# Patient Record
Sex: FEMALE | Race: WHITE | Employment: FULL TIME | URBAN - METROPOLITAN AREA
[De-identification: names, ages, dates, MRNs, and addresses within clinical notes are randomized per-mention and may not be internally consistent; named-entity substitution may affect disease eponyms.]

---

## 2024-04-08 ENCOUNTER — HOSPITAL ENCOUNTER (OUTPATIENT)
Facility: HOSPITAL | Age: 64
Discharge: HOME | End: 2024-04-11
Payer: COMMERCIAL

## 2024-04-08 ENCOUNTER — HOSPITAL ENCOUNTER (OUTPATIENT)
Facility: HOSPITAL | Age: 64
Discharge: HOME | End: 2024-04-11

## 2024-04-08 DIAGNOSIS — Z01.818 PRE-OP TESTING: Primary | ICD-10-CM

## 2024-04-08 PROCEDURE — 93005 ELECTROCARDIOGRAM TRACING: CPT | Performed by: OBSTETRICS & GYNECOLOGY

## 2024-04-09 LAB
EKG ATRIAL RATE: 91 BPM
EKG DIAGNOSIS: NORMAL
EKG P AXIS: 33 DEGREES
EKG P-R INTERVAL: 140 MS
EKG Q-T INTERVAL: 356 MS
EKG QRS DURATION: 84 MS
EKG QTC CALCULATION (BAZETT): 437 MS
EKG R AXIS: 32 DEGREES
EKG T AXIS: 53 DEGREES
EKG VENTRICULAR RATE: 91 BPM

## 2024-04-10 ENCOUNTER — ANESTHESIA EVENT (OUTPATIENT)
Facility: HOSPITAL | Age: 64
End: 2024-04-10
Payer: COMMERCIAL

## 2024-04-10 NOTE — H&P
History  :1.      Parity: Term:1.  Pre-Term: 0.  : 0.  Livin.    Full term: 1.    Pregnancy # Birth order Date Delivery Type GA(wks) Labor(hrs) Weight Sex             Medical History  (Detailed)  Disease Onset Date Comments   Diabetes     Endometriosis     Hypercholesterolemia     Obesity     Polycystic ovary syndrome         Surgical History/Management  (Detailed)  Management Date Comments   ovarian cyst removal       Diagnostics History  Status Study Ordered Completed Interpretation  Result / Report   ordered TRANSVAGINAL US, NON-OB 2024           Problem List  Problem List reviewed.   Problem Description Onset Date Chronic Clinical Status Notes   Hypothyroid  Y     Hypercholesterolaemia  Y     Diabetes 2  Y     PCOS  Y         Medications (active prior to today)  Medication Name Sig Description Start Date Stop Date Refilled Rx Elsewhere   Lipitor 40 mg tablet take 1 tablet by oral route  every day //   Y   metformin 500 mg tablet take 1 tablet by oral route 2 times every day with morning and evening meals //   Y   Repatha SureClick 140 mg/mL subcutaneous pen injector inject 1 milliliter by subcutaneous route  every 2 weeks in the abdomen, thigh, or outer area of upper arm (rotate sites) //   Y   Synthroid 137 mcg tablet take 1 tablet by oral route  every day //   Y   Vitamin D3 50 mcg (2,000 unit) tablet  //   Y       Medication Reconciliation  Medications reconciled today.      Allergies  Ingredient Reaction (Severity) Comment   NO KNOWN ALLERGIES           Family History    (Detailed)    Social History  (Detailed)  Preferred language is English.      Marital Status/Family/Social Support  Marital status:      Tobacco use status: Current non-smoker.    Smoking status: Never smoker.    Alcohol  There is a history of alcohol use.    consumed rarely.    Caffeine  The patient uses caffeine: coffee.      Review of Systems  System Neg/Pos Details   Constitutional Negative Fever.  Postmenopausal bleeding with excessively thickened endometrium.  After reviewing options for management patient agrees with proceeding with hysteroscopy, D&C and possible polypectomy.  Proceed with scheduling..    Plan Orders Anatomic Pathology Report to be performed. Obtained on 04/05/2024.              Medications (Added, Continued or Stopped today)  Start Date Medication Directions PRN Status PRN Reason Instruction Stop Date   04/05/2024 ibuprofen 800 mg tablet take 1 tablet by oral route 3 times every day with food prn pain N       Lipitor 40 mg tablet take 1 tablet by oral route  every day N       metformin 500 mg tablet take 1 tablet by oral route 2 times every day with morning and evening meals N       Repatha SureClick 140 mg/mL subcutaneous pen injector inject 1 milliliter by subcutaneous route  every 2 weeks in the abdomen, thigh, or outer area of upper arm (rotate sites) N       Synthroid 137 mcg tablet take 1 tablet by oral route  every day N       Vitamin D3 50 mcg (2,000 unit) tablet  N          To Be Scheduled / Ordered  Status Order Reason Assessment Timeframe Appointment   ordered Anatomic Pathology Report  N95.0     ordered Hysteroscopy, D&C, possible polypectomy           Orders/Procedures/Instructions/Educations  Labs  Anatomic Pathology Report       Service CPT Mod Dx 1 Dx 2 Dx 3 Dx 4   SYST BP < 130 MM HG 3074F  Z00.00      OFFICE/OUTPATIENT VISIT, EST 57809  N95.0      MED LIST DOCD IN RCRD 1159F  Z00.00      DIAST BP < 80 MM HG 3078F  Z00.00        ndc cpt4    1159F    3074F    3078F    14238     Provider  Francoise Montes De Oca 04/05/2024 12:41 PM   Document generated by: Francoise Montes De Oca 04/05/2024 12:41 PM      ----------------------------------------------------------------------------------------------------------------------------------------------------------------------      Electronically signed by Francoise Montes De Oca MD on 04/05/2024 12:51 PM

## 2024-04-11 ENCOUNTER — ANESTHESIA (OUTPATIENT)
Facility: HOSPITAL | Age: 64
End: 2024-04-11
Payer: COMMERCIAL

## 2024-04-11 ENCOUNTER — HOSPITAL ENCOUNTER (OUTPATIENT)
Facility: HOSPITAL | Age: 64
Setting detail: OUTPATIENT SURGERY
Discharge: HOME | End: 2024-04-11
Attending: OBSTETRICS & GYNECOLOGY | Admitting: OBSTETRICS & GYNECOLOGY
Payer: COMMERCIAL

## 2024-04-11 VITALS
WEIGHT: 289.4 LBS | OXYGEN SATURATION: 100 % | HEART RATE: 66 BPM | HEIGHT: 67 IN | TEMPERATURE: 97.2 F | RESPIRATION RATE: 15 BRPM | BODY MASS INDEX: 45.42 KG/M2 | DIASTOLIC BLOOD PRESSURE: 77 MMHG | SYSTOLIC BLOOD PRESSURE: 148 MMHG

## 2024-04-11 PROBLEM — N95.0 POSTMENOPAUSAL BLEEDING: Status: ACTIVE | Noted: 2024-04-11

## 2024-04-11 PROBLEM — N95.0 POSTMENOPAUSAL BLEEDING: Status: RESOLVED | Noted: 2024-04-11 | Resolved: 2024-04-11

## 2024-04-11 LAB
GLUCOSE BLD STRIP.AUTO-MCNC: 103 MG/DL (ref 70–110)
GLUCOSE BLD STRIP.AUTO-MCNC: 137 MG/DL (ref 70–110)

## 2024-04-11 PROCEDURE — 2580000003 HC RX 258: Performed by: OBSTETRICS & GYNECOLOGY

## 2024-04-11 PROCEDURE — 7100000011 HC PHASE II RECOVERY - ADDTL 15 MIN: Performed by: OBSTETRICS & GYNECOLOGY

## 2024-04-11 PROCEDURE — 3700000000 HC ANESTHESIA ATTENDED CARE: Performed by: OBSTETRICS & GYNECOLOGY

## 2024-04-11 PROCEDURE — 88305 TISSUE EXAM BY PATHOLOGIST: CPT

## 2024-04-11 PROCEDURE — 7100000001 HC PACU RECOVERY - ADDTL 15 MIN: Performed by: OBSTETRICS & GYNECOLOGY

## 2024-04-11 PROCEDURE — 3600000002 HC SURGERY LEVEL 2 BASE: Performed by: OBSTETRICS & GYNECOLOGY

## 2024-04-11 PROCEDURE — 7100000000 HC PACU RECOVERY - FIRST 15 MIN: Performed by: OBSTETRICS & GYNECOLOGY

## 2024-04-11 PROCEDURE — 7100000010 HC PHASE II RECOVERY - FIRST 15 MIN: Performed by: OBSTETRICS & GYNECOLOGY

## 2024-04-11 PROCEDURE — 3700000001 HC ADD 15 MINUTES (ANESTHESIA): Performed by: OBSTETRICS & GYNECOLOGY

## 2024-04-11 PROCEDURE — 2500000003 HC RX 250 WO HCPCS

## 2024-04-11 PROCEDURE — A4217 STERILE WATER/SALINE, 500 ML: HCPCS | Performed by: OBSTETRICS & GYNECOLOGY

## 2024-04-11 PROCEDURE — 3600000012 HC SURGERY LEVEL 2 ADDTL 15MIN: Performed by: OBSTETRICS & GYNECOLOGY

## 2024-04-11 PROCEDURE — 6360000002 HC RX W HCPCS: Performed by: OBSTETRICS & GYNECOLOGY

## 2024-04-11 PROCEDURE — 2709999900 HC NON-CHARGEABLE SUPPLY: Performed by: OBSTETRICS & GYNECOLOGY

## 2024-04-11 PROCEDURE — 6360000002 HC RX W HCPCS

## 2024-04-11 PROCEDURE — 82962 GLUCOSE BLOOD TEST: CPT

## 2024-04-11 RX ORDER — SODIUM CHLORIDE 0.9 % (FLUSH) 0.9 %
5-40 SYRINGE (ML) INJECTION PRN
Status: DISCONTINUED | OUTPATIENT
Start: 2024-04-11 | End: 2024-04-11 | Stop reason: HOSPADM

## 2024-04-11 RX ORDER — PROPOFOL 10 MG/ML
INJECTION, EMULSION INTRAVENOUS PRN
Status: DISCONTINUED | OUTPATIENT
Start: 2024-04-11 | End: 2024-04-11 | Stop reason: SDUPTHER

## 2024-04-11 RX ORDER — DIPHENHYDRAMINE HYDROCHLORIDE 50 MG/ML
12.5 INJECTION INTRAMUSCULAR; INTRAVENOUS
Status: DISCONTINUED | OUTPATIENT
Start: 2024-04-11 | End: 2024-04-11 | Stop reason: HOSPADM

## 2024-04-11 RX ORDER — KETOROLAC TROMETHAMINE 15 MG/ML
INJECTION, SOLUTION INTRAMUSCULAR; INTRAVENOUS PRN
Status: DISCONTINUED | OUTPATIENT
Start: 2024-04-11 | End: 2024-04-11 | Stop reason: SDUPTHER

## 2024-04-11 RX ORDER — HYDROMORPHONE HYDROCHLORIDE 1 MG/ML
0.25 INJECTION, SOLUTION INTRAMUSCULAR; INTRAVENOUS; SUBCUTANEOUS EVERY 5 MIN PRN
Status: DISCONTINUED | OUTPATIENT
Start: 2024-04-11 | End: 2024-04-11 | Stop reason: HOSPADM

## 2024-04-11 RX ORDER — SODIUM CHLORIDE, SODIUM LACTATE, POTASSIUM CHLORIDE, CALCIUM CHLORIDE 600; 310; 30; 20 MG/100ML; MG/100ML; MG/100ML; MG/100ML
INJECTION, SOLUTION INTRAVENOUS CONTINUOUS
Status: DISCONTINUED | OUTPATIENT
Start: 2024-04-11 | End: 2024-04-11 | Stop reason: HOSPADM

## 2024-04-11 RX ORDER — ONDANSETRON 2 MG/ML
4 INJECTION INTRAMUSCULAR; INTRAVENOUS
Status: DISCONTINUED | OUTPATIENT
Start: 2024-04-11 | End: 2024-04-11 | Stop reason: HOSPADM

## 2024-04-11 RX ORDER — BUPIVACAINE HYDROCHLORIDE 2.5 MG/ML
INJECTION, SOLUTION EPIDURAL; INFILTRATION; INTRACAUDAL PRN
Status: DISCONTINUED | OUTPATIENT
Start: 2024-04-11 | End: 2024-04-11 | Stop reason: ALTCHOICE

## 2024-04-11 RX ORDER — LABETALOL HYDROCHLORIDE 5 MG/ML
10 INJECTION, SOLUTION INTRAVENOUS
Status: DISCONTINUED | OUTPATIENT
Start: 2024-04-11 | End: 2024-04-11 | Stop reason: HOSPADM

## 2024-04-11 RX ORDER — DEXAMETHASONE SODIUM PHOSPHATE 4 MG/ML
INJECTION, SOLUTION INTRA-ARTICULAR; INTRALESIONAL; INTRAMUSCULAR; INTRAVENOUS; SOFT TISSUE PRN
Status: DISCONTINUED | OUTPATIENT
Start: 2024-04-11 | End: 2024-04-11 | Stop reason: SDUPTHER

## 2024-04-11 RX ORDER — ONDANSETRON 2 MG/ML
INJECTION INTRAMUSCULAR; INTRAVENOUS PRN
Status: DISCONTINUED | OUTPATIENT
Start: 2024-04-11 | End: 2024-04-11 | Stop reason: SDUPTHER

## 2024-04-11 RX ORDER — FENTANYL CITRATE 50 UG/ML
INJECTION, SOLUTION INTRAMUSCULAR; INTRAVENOUS PRN
Status: DISCONTINUED | OUTPATIENT
Start: 2024-04-11 | End: 2024-04-11 | Stop reason: SDUPTHER

## 2024-04-11 RX ORDER — LIDOCAINE HYDROCHLORIDE 20 MG/ML
INJECTION, SOLUTION EPIDURAL; INFILTRATION; INTRACAUDAL; PERINEURAL PRN
Status: DISCONTINUED | OUTPATIENT
Start: 2024-04-11 | End: 2024-04-11 | Stop reason: SDUPTHER

## 2024-04-11 RX ORDER — SODIUM CHLORIDE 9 MG/ML
INJECTION, SOLUTION INTRAVENOUS PRN
Status: DISCONTINUED | OUTPATIENT
Start: 2024-04-11 | End: 2024-04-11 | Stop reason: HOSPADM

## 2024-04-11 RX ORDER — SODIUM CHLORIDE 0.9 % (FLUSH) 0.9 %
5-40 SYRINGE (ML) INJECTION EVERY 12 HOURS SCHEDULED
Status: DISCONTINUED | OUTPATIENT
Start: 2024-04-11 | End: 2024-04-11 | Stop reason: HOSPADM

## 2024-04-11 RX ORDER — OXYCODONE HYDROCHLORIDE 5 MG/1
5 TABLET ORAL
Status: DISCONTINUED | OUTPATIENT
Start: 2024-04-11 | End: 2024-04-11 | Stop reason: HOSPADM

## 2024-04-11 RX ORDER — MIDAZOLAM HYDROCHLORIDE 1 MG/ML
INJECTION INTRAMUSCULAR; INTRAVENOUS PRN
Status: DISCONTINUED | OUTPATIENT
Start: 2024-04-11 | End: 2024-04-11 | Stop reason: SDUPTHER

## 2024-04-11 RX ORDER — FENTANYL CITRATE 50 UG/ML
25 INJECTION, SOLUTION INTRAMUSCULAR; INTRAVENOUS EVERY 5 MIN PRN
Status: DISCONTINUED | OUTPATIENT
Start: 2024-04-11 | End: 2024-04-11 | Stop reason: HOSPADM

## 2024-04-11 RX ORDER — GLYCOPYRROLATE 0.2 MG/ML
INJECTION INTRAMUSCULAR; INTRAVENOUS PRN
Status: DISCONTINUED | OUTPATIENT
Start: 2024-04-11 | End: 2024-04-11 | Stop reason: SDUPTHER

## 2024-04-11 RX ORDER — NALOXONE HYDROCHLORIDE 0.4 MG/ML
INJECTION, SOLUTION INTRAMUSCULAR; INTRAVENOUS; SUBCUTANEOUS PRN
Status: DISCONTINUED | OUTPATIENT
Start: 2024-04-11 | End: 2024-04-11 | Stop reason: HOSPADM

## 2024-04-11 RX ORDER — DROPERIDOL 2.5 MG/ML
0.62 INJECTION, SOLUTION INTRAMUSCULAR; INTRAVENOUS
Status: DISCONTINUED | OUTPATIENT
Start: 2024-04-11 | End: 2024-04-11 | Stop reason: HOSPADM

## 2024-04-11 RX ADMIN — DEXAMETHASONE SODIUM PHOSPHATE 4 MG: 4 INJECTION, SOLUTION INTRAMUSCULAR; INTRAVENOUS at 09:44

## 2024-04-11 RX ADMIN — ONDANSETRON HYDROCHLORIDE 4 MG: 2 INJECTION INTRAMUSCULAR; INTRAVENOUS at 09:44

## 2024-04-11 RX ADMIN — GLYCOPYRROLATE 0.2 MG: 0.2 INJECTION, SOLUTION INTRAMUSCULAR; INTRAVENOUS at 09:35

## 2024-04-11 RX ADMIN — MIDAZOLAM 2 MG: 1 INJECTION INTRAMUSCULAR; INTRAVENOUS at 09:35

## 2024-04-11 RX ADMIN — SODIUM CHLORIDE, POTASSIUM CHLORIDE, SODIUM LACTATE AND CALCIUM CHLORIDE: 600; 310; 30; 20 INJECTION, SOLUTION INTRAVENOUS at 09:02

## 2024-04-11 RX ADMIN — PROPOFOL 200 MG: 10 INJECTION, EMULSION INTRAVENOUS at 09:38

## 2024-04-11 RX ADMIN — LIDOCAINE HYDROCHLORIDE 100 MG: 20 INJECTION, SOLUTION EPIDURAL; INFILTRATION; INTRACAUDAL; PERINEURAL at 09:38

## 2024-04-11 RX ADMIN — FENTANYL CITRATE 50 MCG: 50 INJECTION, SOLUTION INTRAMUSCULAR; INTRAVENOUS at 10:00

## 2024-04-11 RX ADMIN — KETOROLAC TROMETHAMINE 30 MG: 15 INJECTION, SOLUTION INTRAMUSCULAR; INTRAVENOUS at 10:00

## 2024-04-11 RX ADMIN — FENTANYL CITRATE 50 MCG: 50 INJECTION, SOLUTION INTRAMUSCULAR; INTRAVENOUS at 09:37

## 2024-04-11 ASSESSMENT — PAIN SCALES - GENERAL
PAINLEVEL_OUTOF10: 0

## 2024-04-11 NOTE — ANESTHESIA POSTPROCEDURE EVALUATION
Department of Anesthesiology  Postprocedure Note    Patient: Pily Dowling  MRN: [id number]  YOB: 1960  Date of evaluation: 4/11/2024    Procedure Summary       Date: 04/11/24 Room / Location: [facility] 04 / [facility] MAIN OR    Anesthesia Start: 0935 Anesthesia Stop: 1010    Procedure: HYSTEROSCOPY DILATATION AND CURETTAGE, POLYPECTOMY (Uterus) Diagnosis:       Postmenopausal bleeding      (Postmenopausal bleeding [N95.0])    Surgeons: Francoise Montes De Oca MD Responsible Provider: Bennett Kilgore MD    Anesthesia Type: General ASA Status: 3            Anesthesia Type: General    Christian Phase I: Christina Score: 10    Christian Phase II:      Anesthesia Post Evaluation    Level of consciousness: awake  Airway patency: patent  Nausea & Vomiting: no nausea and no vomiting  Cardiovascular status: blood pressure returned to baseline  Respiratory status: acceptable and room air  Hydration status: euvolemic  Pain management: adequate    No notable events documented.

## 2024-04-11 NOTE — PERIOP NOTE
TRANSFER - IN REPORT:    Verbal report received from BONITA Mulligan on Pily Dowling  being received from PACU for routine progression of patient care      Report consisted of patient's Situation, Background, Assessment and   Recommendations(SBAR).     Information from the following report(s) Intake/Output and MAR was reviewed with the receiving nurse.    Opportunity for questions and clarification was provided.      Assessment completed upon patient's arrival to unit and care assumed.

## 2024-04-11 NOTE — OP NOTE
Patient: Pily Dowling  MRN: [id number]  : 1960  Pre Operative Diagnosis: Postmenopausal bleeding [N95.0]  Post Operative Diagnosis: Postmenopausal bleeding [N95.0]  Procedure: Procedure(s):  HYSTEROSCOPY DILATATION AND CURETTAGE, POLYPECTOMY  Surgeon: Surgeon(s) and Role:     * Francoise Montes De Oca MD - Primary  Surgical Assistant: Circulator: Clyde Govea RN  Surgical Assistant: Den Wen  Scrub Person First: Carmella Kebede  Anesthesia: General  Specimens:   ID Type Source Tests Collected by Time Destination   A : endometrial curettings and polyps Tissue Endometrium SURGICAL PATHOLOGY Francoise Montes De Oca MD 2024 0910      Implants: * No implants in log *  EBL: < 10 mL  Findings: endometrial polypoid tissue  Disposition: To RR, stable    Procedure:   Patient was taken to the OR after informed consent had been obtained. Surgery identification was completed with the patient and the OR team. She was then placed under LMA, prepped and draped in a sterile fashion. Patient identification and surgery identification were completed with the surgeon and the OR team. Attention was turned to the vagina and a weighted speculum was placed. The anterior lip of the cervix was grasped with a single toothed tenaculum. Paracervical block was placed using 10 cc of 0.25% marcaine injected in the normal fashion. The uterus was sounded to 8 cm. The cervix was serially dilated to allow passage of a 5 mm diagnostic hysteroscope. Hysteroscopy was performed with the above noted findings. Guilherme Stone forceps were introduced with the removal of endometrial polyps. D&C was performed until a gritty texture was obtained throughout the uterine cavity.The specimen was sent for permanent pathology. Hysteroscopy was repeated with no evidence of retained tissue fragments. Hysteroscope was removed and all other instruments were removed. Sponge, lap, needle and instrument counts were correct x 2. The patient was awoken from  anesthesia and transferred to the recovery room in stable condition.

## 2024-04-11 NOTE — PERIOP NOTE
TRANSFER - OUT REPORT:    Verbal report given to BONITA Sheth on Pily Dowling  being transferred to Phase 2 for routine post-op       Report consisted of patient's Situation, Background, Assessment and   Recommendations(SBAR).     Information from the following report(s) Nurse Handoff Report, Adult Overview, Surgery Report, Intake/Output, MAR, and Med Rec Status was reviewed with the receiving nurse.           Lines:   Peripheral IV 04/11/24 Left;Posterior Hand (Active)   Site Assessment Clean, dry & intact 04/11/24 1006   Line Status Infusing 04/11/24 1006   Line Care Connections checked and tightened 04/11/24 1006   Phlebitis Assessment No symptoms 04/11/24 1006   Infiltration Assessment 0 04/11/24 1006   Alcohol Cap Used No 04/11/24 1006   Dressing Status Clean, dry & intact 04/11/24 1006   Dressing Type Transparent 04/11/24 1006   Dressing Intervention New 04/11/24 0902        Opportunity for questions and clarification was provided.      Patient transported with:  Registered Nurse

## 2024-04-11 NOTE — ANESTHESIA PRE PROCEDURE
Department of Anesthesiology  Preprocedure Note       Name:  Pily Dowling   Age:  63 y.o.  :  1960                                          MRN:  [id number]         Date:  2024      Surgeon: Surgeon(s):  Francoise Montes De Oca MD    Procedure: Procedure(s):  HYSTEROSCOPY DILATATION AND CURETTAGE , POSSIBLE POLYPECTOMY    Medications prior to admission:   Prior to Admission medications    Medication Sig Start Date End Date Taking? Authorizing Provider   metFORMIN (GLUCOPHAGE) 500 MG tablet Take 2 tablets by mouth daily (with breakfast)    Ina Bullock MD   levothyroxine (SYNTHROID) 137 MCG tablet Take 1 tablet by mouth Monday, Tuesday, Wednesday, Thursday, Friday and Saturday    Ina Bullock MD   hydroCHLOROthiazide (HYDRODIURIL) 25 MG tablet Take 1 tablet by mouth daily    Ina Bullock MD   irbesartan (AVAPRO) 150 MG tablet Take 1 tablet by mouth daily    Ina Bullock MD   atorvastatin (LIPITOR) 40 MG tablet Take 1 tablet by mouth nightly    Ina Bullock MD   Evolocumab (REPATHA) SOSY syringe Inject 1 mL into the skin every 14 days Indications: Cholesterol    ProviderIna MD   vitamin D (VITAMIN D3) 50 MCG (2000) CAPS capsule Take 1 capsule by mouth daily    ProviderIna MD       Current medications:    Current Facility-Administered Medications   Medication Dose Route Frequency Provider Last Rate Last Admin   • lactated ringers IV soln infusion   IntraVENous Continuous Francoise Montes De Oca  mL/hr at 24 0913 NoRateChange at 24 0913       Allergies:  No Known Allergies    Problem List:    Patient Active Problem List   Diagnosis Code   • Postmenopausal bleeding N95.0       Past Medical History:        Diagnosis Date   • Acid reflux     occasional   • Arthritis    • Hyperlipidemia    • Hypertension    • Kidney stone    • Morbid obesity (HCC)    • PCOS (polycystic ovarian syndrome)    • Prediabetes     on metformin   •

## 2024-04-11 NOTE — PERIOP NOTE
Reviewed PTA medication list with patient/caregiver and patient/caregiver denies any additional medications.     Patient admits to having a responsible adult care for them at home for at least 24 hours after surgery.    Patient encouraged to use gown warming system and informed that using said warming gown to regulate body temperature prior to a procedure has been shown to help reduce the risks of blood clots and infection.    Patient's pharmacy of choice verified and documented in PTA medication section.    Dual skin assessment & fall risk band verification completed with DIMA Bolaños RN.

## 2024-04-11 NOTE — PERIOP NOTE
TRANSFER - IN REPORT:    Verbal report received from OR Nurse and CRNA on Pily Dowling  being received from [facility] for routine post-op      Report consisted of patient's Situation, Background, Assessment and   Recommendations(SBAR).     Information from the following report(s) Nurse Handoff Report, Adult Overview, Surgery Report, Intake/Output, MAR, and Med Rec Status was reviewed with the receiving nurse.    Opportunity for questions and clarification was provided.      Assessment completed upon patient's arrival to unit and care assumed.

## 2024-04-11 NOTE — DISCHARGE INSTRUCTIONS
[facility], [address], [place] [address]  [facility], [address], [address], [place] [address]  Office: [phone]  Fax:    [phone]    PROCEDURE: Procedure(s):  HYSTEROSCOPY DILATATION AND CURETTAGE, POLYPECTOMY    NOTIFY [facility] IMMEDIATELY IF ANY OF THE FOLLOWING OCCUR:    You are unable to urinate.  Urgency to urinate is not uncommon.  Excessive vaginal bleeding > 1 maxi pad an hour for more then 2 hours straight.  Temperature above 101.0° and / or chills.  You are nauseous and / or vomiting and you cannot hold down any fluids.  Your pain is not controlled with the pain medication prescribed.    SPECIAL CONSIDERATIONS:     Do not drive for at least 24 hours after the procedure and until you are no longer taking narcotic pain medication and you are able to move and react without hesitation.  You may return to work in 1-2 days       [x] Pelvic rest (nothing in the vagina) for 1-2 weeks.     [x] No heavy lifting over 10 pounds & no strenuous exercise for 1-2 days      [] Other instructions:         MEDICATIONS: PROVIDED AT DISCHARGE OR PREVIOUSLY PRESCRIBED AT PRE OPERATIVE APPOINTMENT WITH [facility] --  Narcotic Pain Med.   []  Norco/Vicodin   []  Percocet®   []  Dilaudid®    Non-narcotic Pain Med.  [x]   Ibuprofen        Antibiotics   []  Cipro®   []  Keflex®     []  Bactrim DS®       Urgency   []   Vesicare®       Nausea      []    Zofran®     []    Phenergan®       Other   []    Colace          [x] Rx provided to patient at pre operative appointment  [] Rx sent electronically today  [] Rx printed today      Our office staff will call you for a post operative check in 1-2 days. If you have not already scheduled your post operative appointment please do so with our office staff during this phone call. Your post operative appointment should be in 2 weeks.    Please contact [facility] at [phone] or go to the nearest  If you have kidney, heart, or liver disease and have to limit fluids, talk with your doctor before you increase the amount of fluids you drink.  When you are able to eat, try clear soups, mild foods, and liquids until all symptoms are gone for 12 to 48 hours. Other good choices include dry toast, crackers, cooked cereal, and gelatin dessert, such as Jell-O.  Do not smoke. Smoking and being around smoke can make nausea worse.   When should you call for help?    Call your doctor now or seek immediate medical care if:    You have new or worse nausea or vomiting.     You are too sick to your stomach to drink any fluids.     You cannot keep down fluids.     You have symptoms of dehydration, such as:  Dry eyes and a dry mouth.  Passing only a little urine.  Feeling thirstier than usual.     You are dizzy or lightheaded, or you feel like you may faint.           Infection After Surgery: Care Instructions  Overview  After surgery, an infection is always possible. It doesn't mean that the surgery didn't go well.  How can you care for yourself at home?  Make sure your surgeon knows about the infection, especially if you saw another doctor about your symptoms.  If your doctor prescribed antibiotics, take them as directed. Do not stop taking them just because you feel better. You need to take the full course of antibiotics.  Keep the skin clean and dry.  You may have a bandage over the cut (incision). A bandage helps the incision heal and protects it. Your doctor will tell you how to take care of this. Keep it clean and dry. You may have drainage from the wound.     Call your doctor now or seek immediate medical care if:    You have signs of an infection such as:  Increased pain, swelling, warmth, or redness in the area.  Red streaks leading from the area.  Pus draining from the wound.  A new or higher fever.       Bleeding After Surgery: Care Instructions  Overview  After surgery, it is common to have some minor bruising or

## 2024-04-11 NOTE — INTERVAL H&P NOTE
Office H+P reviewed and updated. Pt seen and examined. No changes. Scanned to Epic.  Francoise Montes De Oca M.D.

## 2024-10-02 ENCOUNTER — HOSPITAL ENCOUNTER (OUTPATIENT)
Facility: HOSPITAL | Age: 64
Discharge: HOME | End: 2024-10-05
Payer: COMMERCIAL

## 2024-10-02 ENCOUNTER — HOSPITAL ENCOUNTER (OUTPATIENT)
Facility: HOSPITAL | Age: 64
Setting detail: SPECIMEN
Discharge: HOME | End: 2024-10-05

## 2024-10-02 VITALS — BODY MASS INDEX: 44.22 KG/M2 | WEIGHT: 291.8 LBS | HEIGHT: 68 IN

## 2024-10-02 DIAGNOSIS — Z01.818 PRE-OP TESTING: Primary | ICD-10-CM

## 2024-10-02 LAB
ANION GAP SERPL CALC-SCNC: 8 MMOL/L (ref 3–18)
BASOPHILS # BLD: 0.2 K/UL (ref 0–0.1)
BASOPHILS NFR BLD: 2 % (ref 0–2)
BUN SERPL-MCNC: 13 MG/DL (ref 7–18)
BUN/CREAT SERPL: 16 (ref 12–20)
CALCIUM SERPL-MCNC: 9.4 MG/DL (ref 8.5–10.1)
CHLORIDE SERPL-SCNC: 102 MMOL/L (ref 100–111)
CO2 SERPL-SCNC: 27 MMOL/L (ref 21–32)
CREAT SERPL-MCNC: 0.81 MG/DL (ref 0.6–1.3)
DIFFERENTIAL METHOD BLD: ABNORMAL
EKG ATRIAL RATE: 76 BPM
EKG DIAGNOSIS: NORMAL
EKG P AXIS: 21 DEGREES
EKG P-R INTERVAL: 184 MS
EKG Q-T INTERVAL: 376 MS
EKG QRS DURATION: 80 MS
EKG QTC CALCULATION (BAZETT): 423 MS
EKG R AXIS: 11 DEGREES
EKG T AXIS: 47 DEGREES
EKG VENTRICULAR RATE: 76 BPM
EOSINOPHIL # BLD: 0.2 K/UL (ref 0–0.4)
EOSINOPHIL NFR BLD: 2 % (ref 0–5)
ERYTHROCYTE [DISTWIDTH] IN BLOOD BY AUTOMATED COUNT: 12.9 % (ref 11.6–14.5)
EST. AVERAGE GLUCOSE BLD GHB EST-MCNC: 128 MG/DL
GLUCOSE SERPL-MCNC: 171 MG/DL (ref 74–99)
HBA1C MFR BLD: 6.1 % (ref 4.2–5.6)
HCT VFR BLD AUTO: 41.5 % (ref 35–45)
HGB BLD-MCNC: 14 G/DL (ref 12–16)
IMM GRANULOCYTES # BLD AUTO: 0 K/UL (ref 0–0.04)
IMM GRANULOCYTES NFR BLD AUTO: 0 % (ref 0–0.5)
LYMPHOCYTES # BLD: 2.4 K/UL (ref 0.9–3.6)
LYMPHOCYTES NFR BLD: 24 % (ref 21–52)
MCH RBC QN AUTO: 29.3 PG (ref 24–34)
MCHC RBC AUTO-ENTMCNC: 33.7 G/DL (ref 31–37)
MCV RBC AUTO: 86.8 FL (ref 78–100)
MONOCYTES # BLD: 0.6 K/UL (ref 0.05–1.2)
MONOCYTES NFR BLD: 6 % (ref 3–10)
NEUTS SEG # BLD: 6.6 K/UL (ref 1.8–8)
NEUTS SEG NFR BLD: 65 % (ref 40–73)
NRBC # BLD: 0 K/UL (ref 0–0.01)
NRBC BLD-RTO: 0 PER 100 WBC
PLATELET # BLD AUTO: 366 K/UL (ref 135–420)
PMV BLD AUTO: 10.1 FL (ref 9.2–11.8)
POTASSIUM SERPL-SCNC: 4.2 MMOL/L (ref 3.5–5.5)
RBC # BLD AUTO: 4.78 M/UL (ref 4.2–5.3)
SODIUM SERPL-SCNC: 137 MMOL/L (ref 136–145)
WBC # BLD AUTO: 10 K/UL (ref 4.6–13.2)

## 2024-10-02 PROCEDURE — 83036 HEMOGLOBIN GLYCOSYLATED A1C: CPT

## 2024-10-02 PROCEDURE — 93005 ELECTROCARDIOGRAM TRACING: CPT | Performed by: OBSTETRICS & GYNECOLOGY

## 2024-10-02 PROCEDURE — 80048 BASIC METABOLIC PNL TOTAL CA: CPT

## 2024-10-02 PROCEDURE — 85025 COMPLETE CBC W/AUTO DIFF WBC: CPT

## 2024-10-02 NOTE — PERIOP NOTE
Patient reported she had HgA1c done at PCP-Dr. Bah in September. Verified with Susan at the office and she will fax HgA1c results done on 9/6/2024 to PAT.  Patient denies being on Lithium or Digoxin.    Blood specimen sent to lab for CBC w/diff, BMP.  EKG done today.

## 2024-10-07 NOTE — H&P
[facility]  [address]  \[facility]\"" [address]  Tel:  [phone]  Fax: [phone]    Patient: Pily Dowling    YOB: 1960   Birth Sex: Female   Date: 10/04/2024 03:07 PM   Visit Type: Office Visit - GYN     Assessment/Plan  # Detail Type Description    1. Assessment Postmenopausal bleeding (N95.0).    Impression Plan hysteroscopy, D&C, polypectomy..         2. Assessment Polyp of corpus uteri (N84.0).    Impression See 1..              This 64 year old patient presents for Postmenopausal Bleeding:.    History of Present Illness  1.  Postmenopausal Bleeding:   The symptoms began 6 months ago and generally lasts varies. The symptoms are reported as being moderate. The symptoms occur randomly. The location is uterine. The symptoms are described as painless. Aggravating factors include none. Relieving factors include time. The patient states the symptoms are acute and are unchanged. Pt with pmb and SIS showing multiple endometrial polyps. Plan hysteroscopy, D&C. I reviewed with patient risks/benefits/alternatives to surgery. She understands that the risks include but are not limited to: bleeding, need for blood transfusion, risks associated with blood transfusion, infection, injury to internal or adjacent organs, risks associated with receiving anesthesia, post operative complications, readmission to the hospital, wound infection or separation, dvt, pulmonary embolus, pneumonia and death associated with surgery. All questions were answered and surgical consent to be signed at hospital.            Gynecologic History  10/04/2024 03:07 PM  Date of last Pap: 07/29/2011.  Pregnancy # Birth order Date Delivery Type GA(wks) Labor(hrs) Weight Sex             Medical History  (Detailed)  Disease Onset Date Comments   Diabetes     Endometriosis     Hypercholesterolemia     Obesity     Polycystic ovary syndrome         Surgical History/Management  (Detailed)  Management Date  Comments   ovarian cyst removal       Diagnostics History  Status Study Ordered Completed Interpretation  Result / Report   ordered TRANSVAGINAL US, NON-OB 03/05/2024       ordered TRANSVAGINAL US, NON-OB 09/30/2024       ordered TRANSVAGINAL US, NON-OB 07/15/2024           Problem List  Problem List reviewed.   Problem Description Onset Date Chronic Clinical Status Notes   Hypothyroid  Y     Hypercholesterolaemia  Y     Diabetes 2  Y     PCOS  Y         Medications (active prior to today)  Medication Name Sig Description Start Date Stop Date Refilled Rx Elsewhere   Lipitor 40 mg tablet take 1 tablet by oral route  every day //   Y   metformin 500 mg tablet take 1 tablet by oral route 2 times every day with morning and evening meals //   Y   Repatha SureClick 140 mg/mL subcutaneous pen injector inject 1 milliliter by subcutaneous route  every 2 weeks in the abdomen, thigh, or outer area of upper arm (rotate sites) //   Y   Synthroid 137 mcg tablet take 1 tablet by oral route  every day //   Y   Vitamin D3 50 mcg (2,000 unit) tablet  //   Y   ibuprofen 800 mg tablet take 1 tablet by oral route 3 times every day with food prn pain 04/05/2024   N       Medication Reconciliation  Medications reconciled today.      Allergies  Ingredient Reaction (Severity) Comment   NO KNOWN ALLERGIES           Family History    (Detailed)    Social History  (Detailed)  Preferred language is English.      Marital Status/Family/Social Support  Marital status:      Tobacco use status: Current non-smoker.    Smoking status: Never smoker.    Alcohol  There is a history of alcohol use.    consumed rarely.    Caffeine  The patient uses caffeine: coffee.      Review of Systems  System Neg/Pos Details   Constitutional Negative Fever.   Respiratory Negative Dyspnea.   Cardio Negative Chest pain.   GI Negative Abdominal pain.    Negative Dysuria, Hematuria, Urinary frequency and Urinary incontinence.   Endocrine Negative Cold intolerance

## 2024-10-10 ENCOUNTER — HOSPITAL ENCOUNTER (OUTPATIENT)
Facility: HOSPITAL | Age: 64
Setting detail: OUTPATIENT SURGERY
Discharge: HOME | End: 2024-10-10
Attending: OBSTETRICS & GYNECOLOGY | Admitting: OBSTETRICS & GYNECOLOGY
Payer: COMMERCIAL

## 2024-10-10 ENCOUNTER — ANESTHESIA EVENT (OUTPATIENT)
Facility: HOSPITAL | Age: 64
End: 2024-10-10
Payer: COMMERCIAL

## 2024-10-10 ENCOUNTER — ANESTHESIA (OUTPATIENT)
Facility: HOSPITAL | Age: 64
End: 2024-10-10
Payer: COMMERCIAL

## 2024-10-10 VITALS
DIASTOLIC BLOOD PRESSURE: 58 MMHG | SYSTOLIC BLOOD PRESSURE: 115 MMHG | OXYGEN SATURATION: 98 % | HEIGHT: 68 IN | TEMPERATURE: 97.3 F | WEIGHT: 293 LBS | HEART RATE: 71 BPM | RESPIRATION RATE: 16 BRPM | BODY MASS INDEX: 44.41 KG/M2

## 2024-10-10 PROBLEM — N95.0 POSTMENOPAUSAL BLEEDING: Status: RESOLVED | Noted: 2024-04-11 | Resolved: 2024-10-10

## 2024-10-10 LAB — GLUCOSE BLD STRIP.AUTO-MCNC: 123 MG/DL (ref 70–110)

## 2024-10-10 PROCEDURE — 7100000001 HC PACU RECOVERY - ADDTL 15 MIN: Performed by: OBSTETRICS & GYNECOLOGY

## 2024-10-10 PROCEDURE — 7100000011 HC PHASE II RECOVERY - ADDTL 15 MIN: Performed by: OBSTETRICS & GYNECOLOGY

## 2024-10-10 PROCEDURE — 3600000002 HC SURGERY LEVEL 2 BASE: Performed by: OBSTETRICS & GYNECOLOGY

## 2024-10-10 PROCEDURE — 3600000012 HC SURGERY LEVEL 2 ADDTL 15MIN: Performed by: OBSTETRICS & GYNECOLOGY

## 2024-10-10 PROCEDURE — 3700000000 HC ANESTHESIA ATTENDED CARE: Performed by: OBSTETRICS & GYNECOLOGY

## 2024-10-10 PROCEDURE — 2500000003 HC RX 250 WO HCPCS: Performed by: OBSTETRICS & GYNECOLOGY

## 2024-10-10 PROCEDURE — 2580000003 HC RX 258: Performed by: OBSTETRICS & GYNECOLOGY

## 2024-10-10 PROCEDURE — 2720000010 HC SURG SUPPLY STERILE: Performed by: OBSTETRICS & GYNECOLOGY

## 2024-10-10 PROCEDURE — 82962 GLUCOSE BLOOD TEST: CPT

## 2024-10-10 PROCEDURE — 2500000003 HC RX 250 WO HCPCS: Performed by: NURSE ANESTHETIST, CERTIFIED REGISTERED

## 2024-10-10 PROCEDURE — 7100000010 HC PHASE II RECOVERY - FIRST 15 MIN: Performed by: OBSTETRICS & GYNECOLOGY

## 2024-10-10 PROCEDURE — 88305 TISSUE EXAM BY PATHOLOGIST: CPT

## 2024-10-10 PROCEDURE — 2580000003 HC RX 258: Performed by: SPECIALIST

## 2024-10-10 PROCEDURE — 7100000000 HC PACU RECOVERY - FIRST 15 MIN: Performed by: OBSTETRICS & GYNECOLOGY

## 2024-10-10 PROCEDURE — 2709999900 HC NON-CHARGEABLE SUPPLY: Performed by: OBSTETRICS & GYNECOLOGY

## 2024-10-10 PROCEDURE — 6360000002 HC RX W HCPCS: Performed by: OBSTETRICS & GYNECOLOGY

## 2024-10-10 PROCEDURE — 3700000001 HC ADD 15 MINUTES (ANESTHESIA): Performed by: OBSTETRICS & GYNECOLOGY

## 2024-10-10 PROCEDURE — 6360000002 HC RX W HCPCS: Performed by: NURSE ANESTHETIST, CERTIFIED REGISTERED

## 2024-10-10 RX ORDER — PROPOFOL 10 MG/ML
INJECTION, EMULSION INTRAVENOUS
Status: DISCONTINUED | OUTPATIENT
Start: 2024-10-10 | End: 2024-10-10 | Stop reason: SDUPTHER

## 2024-10-10 RX ORDER — SODIUM CHLORIDE, SODIUM LACTATE, POTASSIUM CHLORIDE, CALCIUM CHLORIDE 600; 310; 30; 20 MG/100ML; MG/100ML; MG/100ML; MG/100ML
INJECTION, SOLUTION INTRAVENOUS CONTINUOUS
Status: DISCONTINUED | OUTPATIENT
Start: 2024-10-10 | End: 2024-10-10 | Stop reason: HOSPADM

## 2024-10-10 RX ORDER — SODIUM CHLORIDE 0.9 % (FLUSH) 0.9 %
5-40 SYRINGE (ML) INJECTION PRN
Status: DISCONTINUED | OUTPATIENT
Start: 2024-10-10 | End: 2024-10-10 | Stop reason: HOSPADM

## 2024-10-10 RX ORDER — MIDAZOLAM HYDROCHLORIDE 2 MG/2ML
2 INJECTION, SOLUTION INTRAMUSCULAR; INTRAVENOUS
Status: DISCONTINUED | OUTPATIENT
Start: 2024-10-10 | End: 2024-10-10 | Stop reason: HOSPADM

## 2024-10-10 RX ORDER — DEXAMETHASONE SODIUM PHOSPHATE 4 MG/ML
INJECTION, SOLUTION INTRA-ARTICULAR; INTRALESIONAL; INTRAMUSCULAR; INTRAVENOUS; SOFT TISSUE
Status: DISCONTINUED | OUTPATIENT
Start: 2024-10-10 | End: 2024-10-10 | Stop reason: SDUPTHER

## 2024-10-10 RX ORDER — FENTANYL CITRATE 50 UG/ML
25 INJECTION, SOLUTION INTRAMUSCULAR; INTRAVENOUS EVERY 5 MIN PRN
Status: DISCONTINUED | OUTPATIENT
Start: 2024-10-10 | End: 2024-10-10 | Stop reason: HOSPADM

## 2024-10-10 RX ORDER — ONDANSETRON 2 MG/ML
INJECTION INTRAMUSCULAR; INTRAVENOUS
Status: DISCONTINUED | OUTPATIENT
Start: 2024-10-10 | End: 2024-10-10 | Stop reason: SDUPTHER

## 2024-10-10 RX ORDER — OXYCODONE HYDROCHLORIDE 5 MG/1
5 TABLET ORAL
Status: DISCONTINUED | OUTPATIENT
Start: 2024-10-10 | End: 2024-10-10 | Stop reason: HOSPADM

## 2024-10-10 RX ORDER — MIDAZOLAM HYDROCHLORIDE 1 MG/ML
INJECTION INTRAMUSCULAR; INTRAVENOUS
Status: DISCONTINUED | OUTPATIENT
Start: 2024-10-10 | End: 2024-10-10 | Stop reason: SDUPTHER

## 2024-10-10 RX ORDER — PHENYLEPHRINE HCL IN 0.9% NACL 1 MG/10 ML
SYRINGE (ML) INTRAVENOUS
Status: DISCONTINUED | OUTPATIENT
Start: 2024-10-10 | End: 2024-10-10 | Stop reason: SDUPTHER

## 2024-10-10 RX ORDER — SODIUM CHLORIDE 0.9 % (FLUSH) 0.9 %
5-40 SYRINGE (ML) INJECTION EVERY 12 HOURS SCHEDULED
Status: DISCONTINUED | OUTPATIENT
Start: 2024-10-10 | End: 2024-10-10 | Stop reason: HOSPADM

## 2024-10-10 RX ORDER — ONDANSETRON 2 MG/ML
4 INJECTION INTRAMUSCULAR; INTRAVENOUS
Status: DISCONTINUED | OUTPATIENT
Start: 2024-10-10 | End: 2024-10-10 | Stop reason: HOSPADM

## 2024-10-10 RX ORDER — SODIUM CHLORIDE 9 MG/ML
INJECTION, SOLUTION INTRAVENOUS PRN
Status: DISCONTINUED | OUTPATIENT
Start: 2024-10-10 | End: 2024-10-10 | Stop reason: HOSPADM

## 2024-10-10 RX ORDER — FENTANYL CITRATE 50 UG/ML
INJECTION, SOLUTION INTRAMUSCULAR; INTRAVENOUS
Status: DISCONTINUED | OUTPATIENT
Start: 2024-10-10 | End: 2024-10-10 | Stop reason: SDUPTHER

## 2024-10-10 RX ORDER — DIPHENHYDRAMINE HYDROCHLORIDE 50 MG/ML
12.5 INJECTION INTRAMUSCULAR; INTRAVENOUS
Status: DISCONTINUED | OUTPATIENT
Start: 2024-10-10 | End: 2024-10-10 | Stop reason: HOSPADM

## 2024-10-10 RX ORDER — BUPIVACAINE HYDROCHLORIDE 2.5 MG/ML
INJECTION, SOLUTION EPIDURAL; INFILTRATION; INTRACAUDAL PRN
Status: DISCONTINUED | OUTPATIENT
Start: 2024-10-10 | End: 2024-10-10 | Stop reason: ALTCHOICE

## 2024-10-10 RX ORDER — HYDROMORPHONE HYDROCHLORIDE 1 MG/ML
0.5 INJECTION, SOLUTION INTRAMUSCULAR; INTRAVENOUS; SUBCUTANEOUS EVERY 5 MIN PRN
Status: DISCONTINUED | OUTPATIENT
Start: 2024-10-10 | End: 2024-10-10 | Stop reason: HOSPADM

## 2024-10-10 RX ORDER — NALOXONE HYDROCHLORIDE 0.4 MG/ML
INJECTION, SOLUTION INTRAMUSCULAR; INTRAVENOUS; SUBCUTANEOUS PRN
Status: DISCONTINUED | OUTPATIENT
Start: 2024-10-10 | End: 2024-10-10 | Stop reason: HOSPADM

## 2024-10-10 RX ORDER — LIDOCAINE HYDROCHLORIDE 20 MG/ML
INJECTION, SOLUTION EPIDURAL; INFILTRATION; INTRACAUDAL; PERINEURAL
Status: DISCONTINUED | OUTPATIENT
Start: 2024-10-10 | End: 2024-10-10 | Stop reason: SDUPTHER

## 2024-10-10 RX ORDER — LABETALOL HYDROCHLORIDE 5 MG/ML
5 INJECTION, SOLUTION INTRAVENOUS
Status: DISCONTINUED | OUTPATIENT
Start: 2024-10-10 | End: 2024-10-10 | Stop reason: HOSPADM

## 2024-10-10 RX ORDER — GLYCOPYRROLATE 0.2 MG/ML
INJECTION INTRAMUSCULAR; INTRAVENOUS
Status: DISCONTINUED | OUTPATIENT
Start: 2024-10-10 | End: 2024-10-10 | Stop reason: SDUPTHER

## 2024-10-10 RX ORDER — ACETAMINOPHEN 325 MG/1
650 TABLET ORAL
Status: DISCONTINUED | OUTPATIENT
Start: 2024-10-10 | End: 2024-10-10 | Stop reason: HOSPADM

## 2024-10-10 RX ADMIN — Medication 200 MCG: at 12:16

## 2024-10-10 RX ADMIN — Medication 100 MCG: at 12:11

## 2024-10-10 RX ADMIN — SODIUM CHLORIDE, SODIUM LACTATE, POTASSIUM CHLORIDE, AND CALCIUM CHLORIDE: 600; 310; 30; 20 INJECTION, SOLUTION INTRAVENOUS at 10:46

## 2024-10-10 RX ADMIN — Medication 100 MCG: at 12:24

## 2024-10-10 RX ADMIN — MIDAZOLAM 2 MG: 1 INJECTION INTRAMUSCULAR; INTRAVENOUS at 11:56

## 2024-10-10 RX ADMIN — PROPOFOL 200 MG: 10 INJECTION, EMULSION INTRAVENOUS at 12:03

## 2024-10-10 RX ADMIN — FENTANYL CITRATE 100 MCG: 50 INJECTION, SOLUTION INTRAMUSCULAR; INTRAVENOUS at 12:03

## 2024-10-10 RX ADMIN — LIDOCAINE HYDROCHLORIDE 80 MG: 20 INJECTION, SOLUTION EPIDURAL; INFILTRATION; INTRACAUDAL; PERINEURAL at 12:03

## 2024-10-10 RX ADMIN — GLYCOPYRROLATE 0.2 MG: 0.2 INJECTION, SOLUTION INTRAMUSCULAR; INTRAVENOUS at 11:56

## 2024-10-10 RX ADMIN — DEXAMETHASONE SODIUM PHOSPHATE 4 MG: 4 INJECTION, SOLUTION INTRAMUSCULAR; INTRAVENOUS at 12:06

## 2024-10-10 RX ADMIN — ONDANSETRON HYDROCHLORIDE 4 MG: 2 INJECTION INTRAMUSCULAR; INTRAVENOUS at 12:06

## 2024-10-10 RX ADMIN — SODIUM CHLORIDE, POTASSIUM CHLORIDE, SODIUM LACTATE AND CALCIUM CHLORIDE: 600; 310; 30; 20 INJECTION, SOLUTION INTRAVENOUS at 12:50

## 2024-10-10 ASSESSMENT — PAIN SCALES - GENERAL
PAINLEVEL_OUTOF10: 0

## 2024-10-10 ASSESSMENT — PAIN - FUNCTIONAL ASSESSMENT: PAIN_FUNCTIONAL_ASSESSMENT: 0-10

## 2024-10-10 NOTE — ANESTHESIA POSTPROCEDURE EVALUATION
.Post-Anesthesia Evaluation & Assessment    Visit Vitals  BP (!) 102/53   Pulse 70   Temp 97.6 °F (36.4 °C) (Temporal)   Resp 15   Ht 1.715 m (5' 7.52\")   Wt 133.4 kg (294 lb)   SpO2 99%   BMI 45.34 kg/m²       Nausea/Vomiting: no nausea    Post-operative hydration adequate.    Pain score (VAS): 0    Mental status & Level of consciousness: alert and oriented x 3    Neurological status: moves all extremities, sensation grossly intact    Pulmonary status: airway patent, no supplemental oxygen required    Complications related to anesthesia: none    Additional comments:

## 2024-10-10 NOTE — OP NOTE
Patient: Pily Dowling  MRN: [id number]  : 1960  Pre Operative Diagnosis: Abnormal radiological findings in skin and subcutaneous tissue [R93.89]  Uterine leiomyoma, unspecified location [D25.9]  Postmenopausal bleeding [N95.0]  Intermenstrual pain [N94.0]  Post Operative Diagnosis: Abnormal radiological findings in skin and subcutaneous tissue [R93.89]  Uterine leiomyoma, unspecified location [D25.9]  Postmenopausal bleeding [N95.0]  Intermenstrual pain [N94.0]  Procedure: Procedure(s):  HYSTEROSCOPY DILATATION AND CURETTAGE , POLYPECTOMY  Surgeon: Surgeons and Role:     * Francoise Montes De Oca MD - Primary  Surgical Assistant: Circulator: Radha Chaparro RN  Relief Circulator: Marquita Ortega RN  Relief Scrub: Yesi Mendiola  Scrub Person Second: Selin Lizama  Anesthesia: General  Specimens:   ID Type Source Tests Collected by Time Destination   A : ENDOMETRIAL POLYP Tissue Uterus SURGICAL PATHOLOGY Francoise Montes De Oca MD 10/10/2024 1230      Implants: * No implants in log *  EBL: < 10 mL  Findings: multiple endometrial polyps (5)  Disposition: To RR, stable    Procedure:   Patient was taken to the OR after informed consent had been obtained. Surgery identification was completed with the patient and the OR team. She was then placed under LMA, prepped and draped in a sterile fashion. Patient identification and surgery identification were completed with the surgeon and the OR team. Attention was turned to the vagina and a weighted speculum was placed. The anterior lip of the cervix was grasped with a single toothed tenaculum. Paracervical block was placed using 10 cc of 0.25% marcaine injected in the normal fashion. The uterus was sounded to 9 cm. The cervix was serially dilated to allow passage of a Symphion hysteroscope. Hysteroscopy was performed with the above noted findings. The resection device was introduced and used to remove all endometrial polyps. Hemostasis achieved with coagulation. Hemostasis

## 2024-10-10 NOTE — ANESTHESIA PRE PROCEDURE
bleeding N95.0       Past Medical History:        Diagnosis Date   • Acid reflux     occasional   • Arthritis     hips, not being seen by any specialists   • Basal cell carcinoma 2012    on chest, removed, [facility]   • Cataract     Juan Francisco Rockwell   • Hyperlipidemia     Gabriel Bah   • Hypertension     Gabriel Bah   • Hypothyroid     \"within the last five years\" Gabriel Bah   • Kidney stone     was unable to be found   • Morbid obesity    • PCOS (polycystic ovarian syndrome)     \"for seven or eight years\" on metformin, Gabriel Bah and Francoise Montes De Oca   • Prediabetes     unknown date, Gabriel Bah   • Snores     has not been evaluated for sleep apnea   • Uterine polyp     Francoise Montes De Oca   • Wears glasses        Past Surgical History:        Procedure Laterality Date   • ACHILLES TENDON SURGERY Right 2015   •  SECTION     • COLONOSCOPY  10/19/2020   • DILATION AND CURETTAGE OF UTERUS N/A 2024    HYSTEROSCOPY DILATATION AND CURETTAGE, POLYPECTOMY performed by Francoise Montes De Oca MD at [facility] MAIN OR   • PELVIC LAPAROSCOPY     • SKIN CANCER EXCISION         Social History:    Social History     Tobacco Use   • Smoking status: Never   • Smokeless tobacco: Never   Substance Use Topics   • Alcohol use: Yes     Comment: 1 drink per year                                Counseling given: Not Answered      Vital Signs (Current):   Vitals:    10/10/24 1023   BP: 121/76   Pulse: 86   Resp: 18   Temp: 98 °F (36.7 °C)   TempSrc: Temporal   SpO2: 98%   Weight: 133.4 kg (294 lb)   Height: 1.715 m (5' 7.52\")                                              BP Readings from Last 3 Encounters:   10/10/24 121/76   24 (!) 148/77       NPO Status: Time of last liquid consumption:                         Time of last solid consumption:                         Date of last liquid consumption: 10/09/24                        Date of last solid food consumption: 10/09/24    BMI:   Wt Readings from Last

## 2024-10-10 NOTE — DISCHARGE INSTRUCTIONS
[facility], [address], [place] [address]  [facility], [address], [address], [place] [address]  Office: [phone]  Fax:    [phone]    PROCEDURE: Procedure(s):  HYSTEROSCOPY DILATATION AND CURETTAGE , POLYPECTOMY    NOTIFY [facility] IMMEDIATELY IF ANY OF THE FOLLOWING OCCUR:    You are unable to urinate.  Urgency to urinate is not uncommon.  Excessive vaginal bleeding > 1 maxi pad an hour for more then 2 hours straight.  Temperature above 101.0° and / or chills.  You are nauseous and / or vomiting and you cannot hold down any fluids.  Your pain is not controlled with the pain medication prescribed.    SPECIAL CONSIDERATIONS:     Do not drive for at least 24 hours after the procedure and until you are no longer taking narcotic pain medication and you are able to move and react without hesitation.  You may return to work in 1-2 days       [x] Pelvic rest (nothing in the vagina) for 1 week     [x] No heavy lifting over 10 pounds & no strenuous exercise for 1-2 days      [] Other instructions:         MEDICATIONS: PROVIDED AT DISCHARGE OR PREVIOUSLY PRESCRIBED AT PRE OPERATIVE APPOINTMENT WITH [facility] --  Narcotic Pain Med.   []  Norco/Vicodin   []  Percocet®   []  Dilaudid®    Non-narcotic Pain Med.  [x]   Ibuprofen        Antibiotics   []  Cipro®   []  Keflex®     []  Bactrim DS®       Urgency   []   Vesicare®       Nausea      []    Zofran®     []    Phenergan®       Other   []    Colace          [x] Rx provided to patient at pre operative appointment  [] Rx sent electronically today  [] Rx printed today      Our office staff will call you for a post operative check in 1-2 days. If you have not already scheduled your post operative appointment please do so with our office staff during this phone call. Your post operative appointment should be in 2 weeks.    Please contact [facility] at [phone] or go to the nearest

## 2024-10-10 NOTE — PERIOP NOTE
TRANSFER - OUT REPORT:    Verbal report given to Celi ALVAREZ Rn on Pily Dowling  being transferred to Phase 2 for routine progression of patient care       Report consisted of patient's Situation, Background, Assessment and   Recommendations(SBAR).     Information from the following report(s) Adult Overview, Surgery Report, Intake/Output, and MAR was reviewed with the receiving nurse.           Lines:   Peripheral IV 10/10/24 Left Forearm (Active)   Site Assessment Clean, dry & intact 10/10/24 1305   Line Status Infusing 10/10/24 1305   Line Care Connections checked and tightened 10/10/24 1305   Phlebitis Assessment No symptoms 10/10/24 1305   Infiltration Assessment 0 10/10/24 1305   Dressing Status Clean, dry & intact 10/10/24 1305   Dressing Type Transparent 10/10/24 1305        Opportunity for questions and clarification was provided.      Patient transported with:  Registered Nurse

## 2024-10-10 NOTE — PERIOP NOTE
Reviewed PTA medication list with patient/caregiver and patient/caregiver denies any additional medications.     Patient admits to having a responsible adult care for them at home for at least 24 hours after surgery.    Patient encouraged to use gown warming system and informed that using said warming gown to regulate body temperature prior to a procedure has been shown to help reduce the risks of blood clots and infection.    Patient's pharmacy of choice verified and documented in PTA medication section.    Dual skin assessment & fall risk band verification completed with Jennifer COFFMAN  RN.

## 2024-10-10 NOTE — PERIOP NOTE
TRANSFER - IN REPORT:    Verbal report received from OR nurse and CRNA on Pily Dowling  being received from OR for routine progression of patient care      Report consisted of patient's Situation, Background, Assessment and   Recommendations(SBAR).     Information from the following report(s) Adult Overview, Surgery Report, Intake/Output, and MAR was reviewed with the receiving nurse.    Opportunity for questions and clarification was provided.      Assessment completed upon patient's arrival to unit and care assumed.

## 2024-10-10 NOTE — PERIOP NOTE
TRANSFER - IN REPORT:    Verbal report received from JOHN Camejo RN on Pily Dowling  being received from PACU for routine post-op      Report consisted of patient's Situation, Background, Assessment and   Recommendations(SBAR).     Information from the following report(s) Nurse Handoff Report, Adult Overview, Surgery Report, Intake/Output, and MAR was reviewed with the receiving nurse.    Opportunity for questions and clarification was provided.      Assessment completed upon patient's arrival to unit and care assumed.

## 2025-05-12 ENCOUNTER — HOSPITAL ENCOUNTER (OUTPATIENT)
Facility: HOSPITAL | Age: 65
Discharge: HOME | End: 2025-05-15
Payer: COMMERCIAL

## 2025-05-12 ENCOUNTER — HOSPITAL ENCOUNTER (OUTPATIENT)
Facility: HOSPITAL | Age: 65
Setting detail: SPECIMEN
Discharge: HOME | End: 2025-05-15

## 2025-05-12 DIAGNOSIS — R93.89 ABNORMAL RADIOLOGICAL FINDINGS IN SKIN AND SUBCUTANEOUS TISSUE: ICD-10-CM

## 2025-05-12 DIAGNOSIS — N95.0 POSTMENOPAUSAL BLEEDING: ICD-10-CM

## 2025-05-12 DIAGNOSIS — Z01.818 PREOP EXAMINATION: ICD-10-CM

## 2025-05-12 LAB
EKG ATRIAL RATE: 81 BPM
EKG DIAGNOSIS: NORMAL
EKG P AXIS: 33 DEGREES
EKG P-R INTERVAL: 148 MS
EKG Q-T INTERVAL: 368 MS
EKG QRS DURATION: 78 MS
EKG QTC CALCULATION (BAZETT): 427 MS
EKG R AXIS: 34 DEGREES
EKG T AXIS: 52 DEGREES
EKG VENTRICULAR RATE: 81 BPM
LABCORP SPECIMEN COLLECTION: NORMAL

## 2025-05-12 PROCEDURE — 99001 SPECIMEN HANDLING PT-LAB: CPT

## 2025-05-12 PROCEDURE — 93005 ELECTROCARDIOGRAM TRACING: CPT

## 2025-05-27 ENCOUNTER — ANESTHESIA EVENT (OUTPATIENT)
Facility: HOSPITAL | Age: 65
End: 2025-05-27
Payer: COMMERCIAL

## 2025-06-02 NOTE — H&P
[facility]  [address]  [facility] [address]  Tel:  [phone]  Fax: [phone]    Patient: Pily Dowling    YOB: 1960   Birth Sex: Female   Date: 06/02/2025 08:15 AM   Visit Type: Office Visit - GYN     Assessment/Plan  # Detail Type Description    1. Assessment Postmenopausal bleeding (N95.0).    Impression Patient with persistent postmenopausal bleeding, recurrent endometrial polyps, failed hysteroscopy, D&C with polypectomy twice.  Plan robotic assisted total laparoscopic hysterectomy, bilateral salpingectomies with cystoscopy..         2. Assessment Thickened endometrium (R93.89).    Impression See 1..              This 65 year old patient presents for Postmenopausal Bleeding:.    History of Present Illness  1.  Postmenopausal Bleeding:   The symptoms began 3 months ago and generally lasts varies. The symptoms are reported as being mild. The symptoms occur randomly. The location is uterine. The symptoms are described as painless. Aggravating factors include none. Relieving factors include time. The patient states the symptoms are acute and are unchanged. Patient with recurrent endometrial polyps and persistent postmenopausal bleeding.  Plan robotic assisted total laparoscopic hysterectomy, bilateral salpingectomies with cystoscopy. I reviewed with patient risks/benefits/alternatives to surgery. She understands that the risks include but are not limited to: bleeding, need for blood transfusion, risks associated with blood transfusion, infection, injury to internal or adjacent organs, risks associated with receiving anesthesia, post operative complications, readmission to the hospital, wound infection or separation, dvt, pulmonary embolus, pneumonia and death associated with surgery. All questions were answered and surgical consent to be signed at hospital.            Gynecologic History  06/02/2025 08:15 AM  Date of last Pap: 07/29/2011.  Pregnancy # Birth

## 2025-06-10 ENCOUNTER — ANESTHESIA (OUTPATIENT)
Facility: HOSPITAL | Age: 65
End: 2025-06-10
Payer: COMMERCIAL

## 2025-06-10 ENCOUNTER — HOSPITAL ENCOUNTER (OUTPATIENT)
Facility: HOSPITAL | Age: 65
Setting detail: OUTPATIENT SURGERY
Discharge: HOME | End: 2025-06-10
Attending: OBSTETRICS & GYNECOLOGY | Admitting: OBSTETRICS & GYNECOLOGY
Payer: COMMERCIAL

## 2025-06-10 VITALS
SYSTOLIC BLOOD PRESSURE: 158 MMHG | RESPIRATION RATE: 18 BRPM | WEIGHT: 293 LBS | HEART RATE: 95 BPM | BODY MASS INDEX: 47.09 KG/M2 | OXYGEN SATURATION: 94 % | DIASTOLIC BLOOD PRESSURE: 57 MMHG | TEMPERATURE: 98.9 F | HEIGHT: 66 IN

## 2025-06-10 PROBLEM — N84.0 ENDOMETRIAL POLYP: Status: ACTIVE | Noted: 2025-06-10

## 2025-06-10 PROBLEM — N84.0 ENDOMETRIAL POLYP: Status: RESOLVED | Noted: 2025-06-10 | Resolved: 2025-06-10

## 2025-06-10 PROBLEM — N95.0 POSTMENOPAUSAL BLEEDING: Status: RESOLVED | Noted: 2024-04-11 | Resolved: 2025-06-10

## 2025-06-10 LAB
ABO + RH BLD: NORMAL
BLOOD GROUP ANTIBODIES SERPL: NORMAL
GLUCOSE BLD STRIP.AUTO-MCNC: 165 MG/DL (ref 70–110)
GLUCOSE BLD STRIP.AUTO-MCNC: 179 MG/DL (ref 70–110)
GLUCOSE BLD STRIP.AUTO-MCNC: 184 MG/DL (ref 70–110)
SPECIMEN EXP DATE BLD: NORMAL

## 2025-06-10 PROCEDURE — 6360000002 HC RX W HCPCS: Performed by: NURSE ANESTHETIST, CERTIFIED REGISTERED

## 2025-06-10 PROCEDURE — C1713 ANCHOR/SCREW BN/BN,TIS/BN: HCPCS | Performed by: OBSTETRICS & GYNECOLOGY

## 2025-06-10 PROCEDURE — 3600000009 HC SURGERY ROBOT BASE: Performed by: OBSTETRICS & GYNECOLOGY

## 2025-06-10 PROCEDURE — 7100000011 HC PHASE II RECOVERY - ADDTL 15 MIN: Performed by: OBSTETRICS & GYNECOLOGY

## 2025-06-10 PROCEDURE — 36415 COLL VENOUS BLD VENIPUNCTURE: CPT

## 2025-06-10 PROCEDURE — 2709999900 HC NON-CHARGEABLE SUPPLY: Performed by: OBSTETRICS & GYNECOLOGY

## 2025-06-10 PROCEDURE — 88307 TISSUE EXAM BY PATHOLOGIST: CPT

## 2025-06-10 PROCEDURE — 7100000010 HC PHASE II RECOVERY - FIRST 15 MIN: Performed by: OBSTETRICS & GYNECOLOGY

## 2025-06-10 PROCEDURE — 2720000010 HC SURG SUPPLY STERILE: Performed by: OBSTETRICS & GYNECOLOGY

## 2025-06-10 PROCEDURE — 6360000002 HC RX W HCPCS: Performed by: OBSTETRICS & GYNECOLOGY

## 2025-06-10 PROCEDURE — 86901 BLOOD TYPING SEROLOGIC RH(D): CPT

## 2025-06-10 PROCEDURE — 86850 RBC ANTIBODY SCREEN: CPT

## 2025-06-10 PROCEDURE — 82962 GLUCOSE BLOOD TEST: CPT

## 2025-06-10 PROCEDURE — 6360000002 HC RX W HCPCS: Performed by: ANESTHESIOLOGY

## 2025-06-10 PROCEDURE — S2900 ROBOTIC SURGICAL SYSTEM: HCPCS | Performed by: OBSTETRICS & GYNECOLOGY

## 2025-06-10 PROCEDURE — 6370000000 HC RX 637 (ALT 250 FOR IP): Performed by: ANESTHESIOLOGY

## 2025-06-10 PROCEDURE — 2580000003 HC RX 258: Performed by: OBSTETRICS & GYNECOLOGY

## 2025-06-10 PROCEDURE — 2500000003 HC RX 250 WO HCPCS: Performed by: NURSE ANESTHETIST, CERTIFIED REGISTERED

## 2025-06-10 PROCEDURE — 3700000001 HC ADD 15 MINUTES (ANESTHESIA): Performed by: OBSTETRICS & GYNECOLOGY

## 2025-06-10 PROCEDURE — 2580000003 HC RX 258: Performed by: ANESTHESIOLOGY

## 2025-06-10 PROCEDURE — 7100000001 HC PACU RECOVERY - ADDTL 15 MIN: Performed by: OBSTETRICS & GYNECOLOGY

## 2025-06-10 PROCEDURE — 7100000000 HC PACU RECOVERY - FIRST 15 MIN: Performed by: OBSTETRICS & GYNECOLOGY

## 2025-06-10 PROCEDURE — 2500000003 HC RX 250 WO HCPCS: Performed by: OBSTETRICS & GYNECOLOGY

## 2025-06-10 PROCEDURE — 86900 BLOOD TYPING SEROLOGIC ABO: CPT

## 2025-06-10 PROCEDURE — 3600000019 HC SURGERY ROBOT ADDTL 15MIN: Performed by: OBSTETRICS & GYNECOLOGY

## 2025-06-10 PROCEDURE — 3700000000 HC ANESTHESIA ATTENDED CARE: Performed by: OBSTETRICS & GYNECOLOGY

## 2025-06-10 RX ORDER — OXYCODONE HYDROCHLORIDE 5 MG/1
5 TABLET ORAL
Status: DISCONTINUED | OUTPATIENT
Start: 2025-06-10 | End: 2025-06-10 | Stop reason: HOSPADM

## 2025-06-10 RX ORDER — ENOXAPARIN SODIUM 100 MG/ML
INJECTION SUBCUTANEOUS ONCE
COMMUNITY

## 2025-06-10 RX ORDER — FENTANYL CITRATE 50 UG/ML
25 INJECTION, SOLUTION INTRAMUSCULAR; INTRAVENOUS EVERY 5 MIN PRN
Status: DISCONTINUED | OUTPATIENT
Start: 2025-06-10 | End: 2025-06-10 | Stop reason: HOSPADM

## 2025-06-10 RX ORDER — SODIUM CHLORIDE 0.9 % (FLUSH) 0.9 %
5-40 SYRINGE (ML) INJECTION PRN
Status: DISCONTINUED | OUTPATIENT
Start: 2025-06-10 | End: 2025-06-10 | Stop reason: HOSPADM

## 2025-06-10 RX ORDER — ONDANSETRON 2 MG/ML
INJECTION INTRAMUSCULAR; INTRAVENOUS
Status: DISCONTINUED | OUTPATIENT
Start: 2025-06-10 | End: 2025-06-10 | Stop reason: SDUPTHER

## 2025-06-10 RX ORDER — SODIUM CHLORIDE, SODIUM LACTATE, POTASSIUM CHLORIDE, CALCIUM CHLORIDE 600; 310; 30; 20 MG/100ML; MG/100ML; MG/100ML; MG/100ML
INJECTION, SOLUTION INTRAVENOUS CONTINUOUS
Status: DISCONTINUED | OUTPATIENT
Start: 2025-06-10 | End: 2025-06-10 | Stop reason: HOSPADM

## 2025-06-10 RX ORDER — IPRATROPIUM BROMIDE AND ALBUTEROL SULFATE 2.5; .5 MG/3ML; MG/3ML
1 SOLUTION RESPIRATORY (INHALATION)
Status: DISCONTINUED | OUTPATIENT
Start: 2025-06-10 | End: 2025-06-10 | Stop reason: HOSPADM

## 2025-06-10 RX ORDER — INSULIN LISPRO 100 [IU]/ML
0-4 INJECTION, SOLUTION INTRAVENOUS; SUBCUTANEOUS ONCE
Status: COMPLETED | OUTPATIENT
Start: 2025-06-10 | End: 2025-06-10

## 2025-06-10 RX ORDER — PROPOFOL 10 MG/ML
INJECTION, EMULSION INTRAVENOUS
Status: DISCONTINUED | OUTPATIENT
Start: 2025-06-10 | End: 2025-06-10 | Stop reason: SDUPTHER

## 2025-06-10 RX ORDER — HYDROMORPHONE HYDROCHLORIDE 1 MG/ML
0.5 INJECTION, SOLUTION INTRAMUSCULAR; INTRAVENOUS; SUBCUTANEOUS EVERY 5 MIN PRN
Status: DISCONTINUED | OUTPATIENT
Start: 2025-06-10 | End: 2025-06-10 | Stop reason: HOSPADM

## 2025-06-10 RX ORDER — NALOXONE HYDROCHLORIDE 0.4 MG/ML
INJECTION, SOLUTION INTRAMUSCULAR; INTRAVENOUS; SUBCUTANEOUS PRN
Status: DISCONTINUED | OUTPATIENT
Start: 2025-06-10 | End: 2025-06-10 | Stop reason: HOSPADM

## 2025-06-10 RX ORDER — SODIUM CHLORIDE 0.9 % (FLUSH) 0.9 %
5-40 SYRINGE (ML) INJECTION EVERY 12 HOURS SCHEDULED
Status: DISCONTINUED | OUTPATIENT
Start: 2025-06-10 | End: 2025-06-10 | Stop reason: HOSPADM

## 2025-06-10 RX ORDER — FENTANYL CITRATE 50 UG/ML
INJECTION, SOLUTION INTRAMUSCULAR; INTRAVENOUS
Status: DISCONTINUED | OUTPATIENT
Start: 2025-06-10 | End: 2025-06-10 | Stop reason: SDUPTHER

## 2025-06-10 RX ORDER — SODIUM CHLORIDE, SODIUM LACTATE, POTASSIUM CHLORIDE, AND CALCIUM CHLORIDE .6; .31; .03; .02 G/100ML; G/100ML; G/100ML; G/100ML
IRRIGANT IRRIGATION PRN
Status: DISCONTINUED | OUTPATIENT
Start: 2025-06-10 | End: 2025-06-10 | Stop reason: ALTCHOICE

## 2025-06-10 RX ORDER — DIPHENHYDRAMINE HYDROCHLORIDE 50 MG/ML
12.5 INJECTION, SOLUTION INTRAMUSCULAR; INTRAVENOUS
Status: DISCONTINUED | OUTPATIENT
Start: 2025-06-10 | End: 2025-06-10 | Stop reason: HOSPADM

## 2025-06-10 RX ORDER — ONDANSETRON 2 MG/ML
4 INJECTION INTRAMUSCULAR; INTRAVENOUS
Status: COMPLETED | OUTPATIENT
Start: 2025-06-10 | End: 2025-06-10

## 2025-06-10 RX ORDER — MAGNESIUM HYDROXIDE 1200 MG/15ML
LIQUID ORAL CONTINUOUS PRN
Status: COMPLETED | OUTPATIENT
Start: 2025-06-10 | End: 2025-06-10

## 2025-06-10 RX ORDER — LABETALOL HYDROCHLORIDE 5 MG/ML
10 INJECTION, SOLUTION INTRAVENOUS
Status: DISCONTINUED | OUTPATIENT
Start: 2025-06-10 | End: 2025-06-10 | Stop reason: HOSPADM

## 2025-06-10 RX ORDER — ROCURONIUM BROMIDE 10 MG/ML
INJECTION, SOLUTION INTRAVENOUS
Status: DISCONTINUED | OUTPATIENT
Start: 2025-06-10 | End: 2025-06-10 | Stop reason: SDUPTHER

## 2025-06-10 RX ORDER — MEPERIDINE HYDROCHLORIDE 50 MG/ML
12.5 INJECTION INTRAMUSCULAR; INTRAVENOUS; SUBCUTANEOUS AS NEEDED
Status: DISCONTINUED | OUTPATIENT
Start: 2025-06-10 | End: 2025-06-10 | Stop reason: HOSPADM

## 2025-06-10 RX ORDER — SODIUM CHLORIDE 9 MG/ML
INJECTION, SOLUTION INTRAVENOUS PRN
Status: DISCONTINUED | OUTPATIENT
Start: 2025-06-10 | End: 2025-06-10 | Stop reason: HOSPADM

## 2025-06-10 RX ORDER — DROPERIDOL 2.5 MG/ML
0.62 INJECTION, SOLUTION INTRAMUSCULAR; INTRAVENOUS
Status: COMPLETED | OUTPATIENT
Start: 2025-06-10 | End: 2025-06-10

## 2025-06-10 RX ORDER — BUPIVACAINE HYDROCHLORIDE 2.5 MG/ML
INJECTION, SOLUTION EPIDURAL; INFILTRATION; INTRACAUDAL PRN
Status: DISCONTINUED | OUTPATIENT
Start: 2025-06-10 | End: 2025-06-10 | Stop reason: ALTCHOICE

## 2025-06-10 RX ORDER — MIDAZOLAM HYDROCHLORIDE 1 MG/ML
INJECTION, SOLUTION INTRAMUSCULAR; INTRAVENOUS
Status: DISCONTINUED | OUTPATIENT
Start: 2025-06-10 | End: 2025-06-10 | Stop reason: SDUPTHER

## 2025-06-10 RX ADMIN — FENTANYL CITRATE 100 MCG: 50 INJECTION INTRAMUSCULAR; INTRAVENOUS at 08:55

## 2025-06-10 RX ADMIN — Medication 3000 MG: at 09:02

## 2025-06-10 RX ADMIN — ROCURONIUM BROMIDE 50 MG: 10 INJECTION, SOLUTION INTRAVENOUS at 08:55

## 2025-06-10 RX ADMIN — SODIUM CHLORIDE, SODIUM LACTATE, POTASSIUM CHLORIDE, AND CALCIUM CHLORIDE: .6; .31; .03; .02 INJECTION, SOLUTION INTRAVENOUS at 14:08

## 2025-06-10 RX ADMIN — HYDROMORPHONE HYDROCHLORIDE 0.5 MG: 1 INJECTION, SOLUTION INTRAMUSCULAR; INTRAVENOUS; SUBCUTANEOUS at 11:20

## 2025-06-10 RX ADMIN — ONDANSETRON HYDROCHLORIDE 4 MG: 2 INJECTION INTRAMUSCULAR; INTRAVENOUS at 10:27

## 2025-06-10 RX ADMIN — DROPERIDOL 0.62 MG: 2.5 INJECTION, SOLUTION INTRAMUSCULAR; INTRAVENOUS at 11:29

## 2025-06-10 RX ADMIN — MIDAZOLAM 2 MG: 1 INJECTION INTRAMUSCULAR; INTRAVENOUS at 08:50

## 2025-06-10 RX ADMIN — SODIUM CHLORIDE, SODIUM LACTATE, POTASSIUM CHLORIDE, AND CALCIUM CHLORIDE: 600; 310; 30; 20 INJECTION, SOLUTION INTRAVENOUS at 08:07

## 2025-06-10 RX ADMIN — SODIUM CHLORIDE, SODIUM LACTATE, POTASSIUM CHLORIDE, AND CALCIUM CHLORIDE: 600; 310; 30; 20 INJECTION, SOLUTION INTRAVENOUS at 14:10

## 2025-06-10 RX ADMIN — SODIUM CHLORIDE, SODIUM LACTATE, POTASSIUM CHLORIDE, AND CALCIUM CHLORIDE: 600; 310; 30; 20 INJECTION, SOLUTION INTRAVENOUS at 08:50

## 2025-06-10 RX ADMIN — SODIUM CHLORIDE: 900 INJECTION, SOLUTION INTRAVENOUS at 11:52

## 2025-06-10 RX ADMIN — INSULIN LISPRO 1 UNITS: 100 INJECTION, SOLUTION INTRAVENOUS; SUBCUTANEOUS at 10:57

## 2025-06-10 RX ADMIN — HYDROMORPHONE HYDROCHLORIDE 0.5 MG: 1 INJECTION, SOLUTION INTRAMUSCULAR; INTRAVENOUS; SUBCUTANEOUS at 11:32

## 2025-06-10 RX ADMIN — ONDANSETRON 4 MG: 2 INJECTION, SOLUTION INTRAMUSCULAR; INTRAVENOUS at 11:21

## 2025-06-10 RX ADMIN — PROPOFOL 200 MG: 10 INJECTION, EMULSION INTRAVENOUS at 08:55

## 2025-06-10 ASSESSMENT — PAIN SCALES - GENERAL
PAINLEVEL_OUTOF10: 4
PAINLEVEL_OUTOF10: 0
PAINLEVEL_OUTOF10: 4
PAINLEVEL_OUTOF10: 7
PAINLEVEL_OUTOF10: 7
PAINLEVEL_OUTOF10: 0
PAINLEVEL_OUTOF10: 0

## 2025-06-10 ASSESSMENT — PAIN - FUNCTIONAL ASSESSMENT
PAIN_FUNCTIONAL_ASSESSMENT: 0-10
PAIN_FUNCTIONAL_ASSESSMENT: 0-10
PAIN_FUNCTIONAL_ASSESSMENT: ACTIVITIES ARE NOT PREVENTED
PAIN_FUNCTIONAL_ASSESSMENT: 0-10
PAIN_FUNCTIONAL_ASSESSMENT: ACTIVITIES ARE NOT PREVENTED
PAIN_FUNCTIONAL_ASSESSMENT: 0-10
PAIN_FUNCTIONAL_ASSESSMENT: 0-10
PAIN_FUNCTIONAL_ASSESSMENT: ACTIVITIES ARE NOT PREVENTED
PAIN_FUNCTIONAL_ASSESSMENT: 0-10
PAIN_FUNCTIONAL_ASSESSMENT: FACE, LEGS, ACTIVITY, CRY, AND CONSOLABILITY (FLACC)

## 2025-06-10 ASSESSMENT — PAIN DESCRIPTION - LOCATION
LOCATION: ABDOMEN

## 2025-06-10 ASSESSMENT — PAIN DESCRIPTION - DESCRIPTORS
DESCRIPTORS: PRESSURE
DESCRIPTORS: PRESSURE
DESCRIPTORS: ACHING
DESCRIPTORS: ACHING

## 2025-06-10 ASSESSMENT — PAIN DESCRIPTION - ORIENTATION
ORIENTATION: ANTERIOR

## 2025-06-10 NOTE — OP NOTE
Patient: Pily Dowling  MRN: [id number]  : 1960  Pre Operative Diagnosis: Abnormal radiological findings in skin and subcutaneous tissue [R93.89]  Postmenopausal bleeding [N95.0]  Post Operative Diagnosis: Abnormal radiological findings in skin and subcutaneous tissue [R93.89]  Postmenopausal bleeding [N95.0]  Procedure: Procedure(s):  ROBOTIC ASSISTED TOTAL LAPAROSCOPIC HYSTERECTOMY, BILATERAL SALPINGECTOMY, CYSTOSCOPY ERAS \"SPEC POP\"  Surgeon: Surgeons and Role:     * Francoise Montes De Oca MD - Primary  Surgical Assistant: Circulator: Jm Flaherty RN  Surgical Assistant: Maggy Ziegler  Scrub Person First: Melody Conway SA Orientation: Yesi Mendiola  Anesthesia: General  Specimens:   ID Type Source Tests Collected by Time Destination   A : UTERUS, CERVIX, BILATERAL FALLOPIAN TUBES Tissue Uterus SURGICAL PATHOLOGY Francoise Montes De Oca MD 6/10/2025 1006      Implants: * No implants in log *  EBL: 50 mL  Findings: 12 week sized uterus with multiple uterine fibroids, endometrial polyps notes, nl appearing ovaries  Disposition: To RR, stable    The patient was brought to the operating room where patient identification and surgery identification were completed with the patient and the OR team. The patient was transferred to the OR table and GETA was obtained without difficulty.  The left and right arms were padded and tucked. SCD's were on and activated. The patient was placed in the lithotomy position in adjustable Smith stirrups and prepped and draped in the normal sterile fashion. Patient identification and surgery identification were repeated with the surgeon and the OR team.   A bivalve speculum was placed vaginally and the anterior lip of the cervix was grasped with a single toothed tenaculum.The uterus was sounded to 10 cm. A Li-Lamonte uterine manipulator was placed in the normal fashion. All other instruments were removed. A landeros catheter was placed with clear urine noted.   Attention was turned

## 2025-06-10 NOTE — PERIOP NOTE
Dr. Gonzalez consulted regarding 184 blood sugar in pacu. Orders to give 1 unit of insulin. Given.

## 2025-06-10 NOTE — PERIOP NOTE
TRANSFER - OUT REPORT:    Verbal report given to JUSTINA MESA on Pily Dowling  being transferred to PHASE 2 for routine post-op       Report consisted of patient's Situation, Background, Assessment and   Recommendations(SBAR).     Information from the following report(s) Nurse Handoff Report, Surgery Report, Intake/Output, MAR, and Recent Results was reviewed with the receiving nurse.           Lines:   Peripheral IV 06/10/25 Proximal;Right;Ventral Forearm (Active)   Site Assessment Clean, dry & intact 06/10/25 1106   Line Status Infusing 06/10/25 1106   Line Care Cap changed 06/10/25 0803   Phlebitis Assessment No symptoms 06/10/25 1106   Infiltration Assessment 0 06/10/25 1106   Alcohol Cap Used Yes 06/10/25 0803   Dressing Status Clean, dry & intact 06/10/25 1106   Dressing Type Transparent 06/10/25 1106   Dressing Intervention New 06/10/25 0803       Peripheral IV 06/10/25 Left;Ventral Forearm (Active)   Site Assessment Clean, dry & intact 06/10/25 1106   Line Status Capped 06/10/25 1106   Line Care Cap changed 06/10/25 0807   Phlebitis Assessment No symptoms 06/10/25 1106   Infiltration Assessment 0 06/10/25 1106   Alcohol Cap Used Yes 06/10/25 0807   Dressing Status Clean, dry & intact 06/10/25 1106   Dressing Type Transparent 06/10/25 1106   Dressing Intervention New 06/10/25 0807        Opportunity for questions and clarification was provided.      Patient transported with:  Tech      Intake/Output Summary (Last 24 hours) at 6/10/2025 1210  Last data filed at 6/10/2025 1207  Gross per 24 hour   Intake 1700 ml   Output 75 ml   Net 1625 ml

## 2025-06-10 NOTE — PERIOP NOTE
Assumed care of pt from JOHN Blake RN - pt voided 100 ml light yellow urine with specks of blood. Pt states \" can I go home now\"

## 2025-06-10 NOTE — PERIOP NOTE
TRANSFER - IN REPORT:    Verbal report received from BONITA Rodriguez on Pily Dowling  being received from PHASE 1 for routine post-op      Report consisted of patient's Situation, Background, Assessment and   Recommendations(SBAR).     Information from the following report(s) Nurse Handoff Report, Surgery Report, and Intake/Output was reviewed with the receiving nurse.    Opportunity for questions and clarification was provided.      Assessment completed upon patient's arrival to unit and care assumed.

## 2025-06-10 NOTE — PERIOP NOTE
Reviewed PTA medication list with patient/caregiver and patient/caregiver denies any additional medications.     Patient admits to having a responsible adult care for them at home for at least 24 hours after surgery.    Patient encouraged to use gown warming system and informed that using said warming gown to regulate body temperature prior to a procedure has been shown to help reduce the risks of blood clots and infection.    Patient's pharmacy of choice verified and documented in PTA medication section.    Dual skin assessment & fall risk band verification completed with BONITA Payton.

## 2025-06-10 NOTE — PERIOP NOTE
Ambulating w/o problem- encouraged to increase fluid intake- pending d/c home when pt voids. Pt aware of the plan.

## 2025-06-10 NOTE — ANESTHESIA PRE PROCEDURE
Department of Anesthesiology  Preprocedure Note       Name:  Pily Dowling   Age:  65 y.o.  :  1960                                          MRN:  [id number]         Date:  6/10/2025      Surgeon: Surgeon(s):  Francoise Montes De Oca MD    Procedure: Procedure(s):  ROBOTIC ASSISTED TOTAL LAPAROSCOPIC HYSTERECTOMY, BILATERAL SALPINGECTOMY, CYSTOSCOPY ERAS \"SPEC POP\"    Medications prior to admission:   Prior to Admission medications    Medication Sig Start Date End Date Taking? Authorizing Provider   enoxaparin (LOVENOX) 40 MG/0.4ML Inject into the skin once   Yes ProviderIna MD   ezetimibe (ZETIA) 10 MG tablet Take 1 tablet by mouth daily   Yes Ina Bullock MD   acetaminophen (TYLENOL) 500 MG tablet Take 1 tablet by mouth every 6 hours as needed for Pain   Yes ProviderIna MD   metFORMIN (GLUCOPHAGE) 500 MG tablet Take 2 tablets by mouth daily (with breakfast)   Yes Ina Bullock MD   levothyroxine (SYNTHROID) 137 MCG tablet Take 1 tablet by mouth Monday, Tuesday, Wednesday, Thursday, Friday and Saturday   Yes ProviderIna MD   hydroCHLOROthiazide (HYDRODIURIL) 25 MG tablet Take 1 tablet by mouth daily   Yes Ina Bullock MD   irbesartan (AVAPRO) 150 MG tablet Take 1 tablet by mouth daily   Yes ProviderIna MD   atorvastatin (LIPITOR) 40 MG tablet Take 1 tablet by mouth nightly   Yes ProviderIna MD   vitamin D (VITAMIN D3) 50 MCG (2000) CAPS capsule Take 1 capsule by mouth daily   Yes ProviderIna MD   Evolocumab (REPATHA) SOSY syringe Inject 1 mL into the skin every 14 days Indications: Cholesterol    ProviderIna MD       Current medications:    Current Facility-Administered Medications   Medication Dose Route Frequency Provider Last Rate Last Admin   • lactated ringers infusion   IntraVENous Continuous Francoise Montes De Oca  mL/hr at 06/10/25 0807 New Bag at 06/10/25 0807   • ceFAZolin (ANCEF) 3000 mg in sterile

## 2025-06-10 NOTE — PERIOP NOTE
No intake/output data recorded.  I/O this shift:  In: 2140 [P.O.:240; I.V.:1900]  Out: 175 [Urine:175]    Pt states voided again before discharge

## 2025-06-10 NOTE — PERIOP NOTE
FACE TO FACE  Discharged  instruction given to family and agreed with the plan. Discharged includes diet, activity limitations , medication to continue and f/u appointment. WILL D/c to home in stable condition with care of family.

## 2025-06-10 NOTE — ANESTHESIA POSTPROCEDURE EVALUATION
Post-Anesthesia Evaluation and Assessment    Cardiovascular Function/Vital Signs  Visit Vitals  BP (!) 169/84   Pulse 98   Temp 97.1 °F (36.2 °C) (Temporal)   Resp 16   Ht 1.676 m (5' 6\")   Wt 134.7 kg (297 lb)   SpO2 93%   BMI 47.94 kg/m²       Patient is status post Procedure(s):  ROBOTIC ASSISTED TOTAL LAPAROSCOPIC HYSTERECTOMY, BILATERAL SALPINGECTOMY, CYSTOSCOPY ERAS \"SPEC POP\".    Nausea/Vomiting: Controlled.    Postoperative hydration reviewed and adequate.    Pain:      Managed.    Neurological Status:       At baseline.    Mental Status and Level of Consciousness: Arousable.    Pulmonary Status:       Adequate oxygenation and airway patent.    Complications related to anesthesia: None    Patient has met all discharge requirements.    Signed By: Matthieu Gonzalez MD    Megan 10, 2025

## (undated) DEVICE — GARMENT,MEDLINE,DVT,INT,CALF,MED, GEN2: Brand: MEDLINE

## (undated) DEVICE — TIP IU L8CM DIA6.7MM BLU SIL FLX DISP RUMI II

## (undated) DEVICE — SYRINGE MED 10ML LUERLOCK TIP W/O SFTY DISP

## (undated) DEVICE — GLOVE SURG SZ 65 CRM LTX FREE POLYISOPRENE POLYMER BEAD ANTI

## (undated) DEVICE — DRAPE C ARM UNIV W41XL74IN CLR PLAS XR VELC CLSR POLY STRP

## (undated) DEVICE — D&C HYSTEROSCOPY: Brand: MEDLINE INDUSTRIES, INC.

## (undated) DEVICE — SUTURE ABSORBABLE MONOFILAMENT 0 GS22 9 IN GRN V-LOC 180 VLOCL2246

## (undated) DEVICE — ARM DRAPE

## (undated) DEVICE — SOLUTION ANTIFOG VIS SYS CLEARIFY LAPSCP

## (undated) DEVICE — BLADELESS OBTURATOR: Brand: WECK VISTA

## (undated) DEVICE — SYSTEM POS SZ 36 X 20 X 1 IN INTEGR ADJ ARM PROTECTOR LIFT

## (undated) DEVICE — LIQUIBAND RAPID ADHESIVE 36/CS 0.8ML: Brand: MEDLINE

## (undated) DEVICE — SYMPHION OPERATIVE HYSTEROSCOPY SYSTEM FLUID MANAGEMENT ACCESSORIES FOR USE WITH SYMPHION CONTROLLER: Brand: SYMPHION FLUID MANAGEMENT ACCESSORIES

## (undated) DEVICE — TIP COVER ACCESSORY

## (undated) DEVICE — SUTURE MONOCRYL SZ 3-0 L27IN ABSRB UD PS-2 3/8 CIR REV CUT NDL MCP427H

## (undated) DEVICE — SYSTEM ES CUP DIA3.5CM PNEUMO OCCL BLLN DISP FOR CLIN POS

## (undated) DEVICE — SOLUTION IV 1000ML LAC RINGERS PH 6.5 INJ USP VIAFLX PLAS

## (undated) DEVICE — SET TBNG DISP TIP FOR AHTO

## (undated) DEVICE — SET,IRRIGATION,CYSTO/TUR,90": Brand: MEDLINE

## (undated) DEVICE — LAPAROSCOPIC TROCAR SLEEVE/SINGLE USE: Brand: KII® OPTICAL ACCESS SYSTEM

## (undated) DEVICE — ELECTRODE PT RET AD L9FT HI MOIST COND ADH HYDRGEL CORDED

## (undated) DEVICE — GLOVE ORANGE PI 7   MSG9070

## (undated) DEVICE — APPLICATOR SURG XL L38CM FOR ARISTA ABSRB HEMSTAT FLEXITIP

## (undated) DEVICE — COLUMN DRAPE

## (undated) DEVICE — SYRINGE MED 50ML LUERLOCK TIP

## (undated) DEVICE — AGENT HEMSTAT 3GM PURIFIED PLNT STARCH PWD ABSRB ARISTA AH

## (undated) DEVICE — SOLUTION IRRIG 500ML 0.9% SOD CHLO USP POUR PLAS BTL

## (undated) DEVICE — SOLUTION IV 500ML 0.9% SODIUM CHLORIDE INJ USP

## (undated) DEVICE — SEAL

## (undated) DEVICE — ROBOTIC PACK: Brand: MEDLINE INDUSTRIES, INC.

## (undated) DEVICE — SYMPHION OPERATIVE HYSTEROSCOPY SYSTEM RESECTING DEVICE FOR USE WITH SYMPHION CONTROLLER: Brand: SYMPHION RESECTING DEVICE

## (undated) DEVICE — TUBE SET WITH SMOKE EVACUATION